# Patient Record
Sex: MALE | Race: WHITE | NOT HISPANIC OR LATINO | Employment: OTHER | URBAN - METROPOLITAN AREA
[De-identification: names, ages, dates, MRNs, and addresses within clinical notes are randomized per-mention and may not be internally consistent; named-entity substitution may affect disease eponyms.]

---

## 2018-08-20 ENCOUNTER — HOSPITAL ENCOUNTER (INPATIENT)
Facility: HOSPITAL | Age: 83
LOS: 3 days | Discharge: NURSING, REHAB OR LONG-TERM CARE | End: 2018-08-25
Attending: EMERGENCY MEDICINE | Admitting: HOSPITALIST

## 2018-08-20 DIAGNOSIS — R53.1 GENERAL WEAKNESS: Primary | ICD-10-CM

## 2018-08-20 DIAGNOSIS — N17.9 AKI (ACUTE KIDNEY INJURY) (HCC): ICD-10-CM

## 2018-08-20 DIAGNOSIS — M25.561 ACUTE PAIN OF RIGHT KNEE: ICD-10-CM

## 2018-08-20 PROCEDURE — 99285 EMERGENCY DEPT VISIT HI MDM: CPT

## 2018-08-21 ENCOUNTER — APPOINTMENT (OUTPATIENT)
Dept: GENERAL RADIOLOGY | Facility: HOSPITAL | Age: 83
End: 2018-08-21

## 2018-08-21 PROBLEM — R53.1 GENERAL WEAKNESS: Status: ACTIVE | Noted: 2018-08-21

## 2018-08-21 PROBLEM — G47.33 OSA (OBSTRUCTIVE SLEEP APNEA): Status: ACTIVE | Noted: 2018-08-21

## 2018-08-21 PROBLEM — F03.90 DEMENTIA (HCC): Status: ACTIVE | Noted: 2018-08-21

## 2018-08-21 PROBLEM — I25.10 CAD (CORONARY ARTERY DISEASE): Status: ACTIVE | Noted: 2018-08-21

## 2018-08-21 PROBLEM — S82.009A PATELLAR FRACTURE: Status: ACTIVE | Noted: 2018-08-21

## 2018-08-21 PROBLEM — R73.02 IGT (IMPAIRED GLUCOSE TOLERANCE): Status: ACTIVE | Noted: 2018-08-21

## 2018-08-21 PROBLEM — R60.9 EDEMA: Status: ACTIVE | Noted: 2018-08-21

## 2018-08-21 PROBLEM — I10 HYPERTENSION: Status: ACTIVE | Noted: 2018-08-21

## 2018-08-21 PROBLEM — Z79.01 ANTICOAGULATED ON COUMADIN: Status: ACTIVE | Noted: 2018-08-21

## 2018-08-21 PROBLEM — E66.01 MORBID OBESITY (HCC): Status: ACTIVE | Noted: 2018-08-21

## 2018-08-21 PROBLEM — I50.9 CHF (CONGESTIVE HEART FAILURE) (HCC): Status: ACTIVE | Noted: 2018-08-21

## 2018-08-21 PROBLEM — N28.9 RENAL INSUFFICIENCY: Status: ACTIVE | Noted: 2018-08-21

## 2018-08-21 PROBLEM — I48.91 A-FIB (HCC): Status: ACTIVE | Noted: 2018-08-21

## 2018-08-21 PROCEDURE — 97162 PT EVAL MOD COMPLEX 30 MIN: CPT

## 2018-08-21 PROCEDURE — G0378 HOSPITAL OBSERVATION PER HR: HCPCS

## 2018-08-21 PROCEDURE — 93010 ELECTROCARDIOGRAM REPORT: CPT | Performed by: INTERNAL MEDICINE

## 2018-08-21 PROCEDURE — 80053 COMPREHEN METABOLIC PANEL: CPT | Performed by: EMERGENCY MEDICINE

## 2018-08-21 PROCEDURE — 85610 PROTHROMBIN TIME: CPT | Performed by: HOSPITALIST

## 2018-08-21 PROCEDURE — 83880 ASSAY OF NATRIURETIC PEPTIDE: CPT | Performed by: HOSPITALIST

## 2018-08-21 PROCEDURE — G8978 MOBILITY CURRENT STATUS: HCPCS

## 2018-08-21 PROCEDURE — 63710000001 DIPHENHYDRAMINE PER 50 MG: Performed by: HOSPITALIST

## 2018-08-21 PROCEDURE — 85610 PROTHROMBIN TIME: CPT | Performed by: EMERGENCY MEDICINE

## 2018-08-21 PROCEDURE — 81001 URINALYSIS AUTO W/SCOPE: CPT | Performed by: EMERGENCY MEDICINE

## 2018-08-21 PROCEDURE — 93005 ELECTROCARDIOGRAM TRACING: CPT | Performed by: HOSPITALIST

## 2018-08-21 PROCEDURE — 85025 COMPLETE CBC W/AUTO DIFF WBC: CPT | Performed by: EMERGENCY MEDICINE

## 2018-08-21 PROCEDURE — 73562 X-RAY EXAM OF KNEE 3: CPT

## 2018-08-21 PROCEDURE — 83036 HEMOGLOBIN GLYCOSYLATED A1C: CPT | Performed by: HOSPITALIST

## 2018-08-21 PROCEDURE — G8979 MOBILITY GOAL STATUS: HCPCS

## 2018-08-21 RX ORDER — SODIUM CHLORIDE 0.9 % (FLUSH) 0.9 %
1-10 SYRINGE (ML) INJECTION AS NEEDED
Status: DISCONTINUED | OUTPATIENT
Start: 2018-08-21 | End: 2018-08-25 | Stop reason: HOSPADM

## 2018-08-21 RX ORDER — DIPHENHYDRAMINE HCL 25 MG
25 CAPSULE ORAL ONCE
Status: COMPLETED | OUTPATIENT
Start: 2018-08-21 | End: 2018-08-21

## 2018-08-21 RX ORDER — ONDANSETRON 4 MG/1
4 TABLET, FILM COATED ORAL EVERY 6 HOURS PRN
Status: DISCONTINUED | OUTPATIENT
Start: 2018-08-21 | End: 2018-08-25 | Stop reason: HOSPADM

## 2018-08-21 RX ORDER — WARFARIN SODIUM 4 MG/1
4 TABLET ORAL DAILY
Status: DISCONTINUED | OUTPATIENT
Start: 2018-08-21 | End: 2018-08-22

## 2018-08-21 RX ORDER — ONDANSETRON 2 MG/ML
4 INJECTION INTRAMUSCULAR; INTRAVENOUS EVERY 6 HOURS PRN
Status: DISCONTINUED | OUTPATIENT
Start: 2018-08-21 | End: 2018-08-25 | Stop reason: HOSPADM

## 2018-08-21 RX ORDER — ALLOPURINOL 100 MG/1
200 TABLET ORAL DAILY
Status: DISCONTINUED | OUTPATIENT
Start: 2018-08-21 | End: 2018-08-25 | Stop reason: HOSPADM

## 2018-08-21 RX ORDER — HYDROCODONE BITARTRATE AND ACETAMINOPHEN 5; 325 MG/1; MG/1
1 TABLET ORAL EVERY 4 HOURS PRN
Status: DISCONTINUED | OUTPATIENT
Start: 2018-08-21 | End: 2018-08-21

## 2018-08-21 RX ORDER — TAMSULOSIN HYDROCHLORIDE 0.4 MG/1
0.4 CAPSULE ORAL DAILY
Status: DISCONTINUED | OUTPATIENT
Start: 2018-08-21 | End: 2018-08-25 | Stop reason: HOSPADM

## 2018-08-21 RX ORDER — MEMANTINE HYDROCHLORIDE 10 MG/1
10 TABLET ORAL EVERY 12 HOURS SCHEDULED
Status: DISCONTINUED | OUTPATIENT
Start: 2018-08-21 | End: 2018-08-25 | Stop reason: HOSPADM

## 2018-08-21 RX ORDER — MEMANTINE HYDROCHLORIDE 10 MG/1
10 TABLET ORAL DAILY
COMMUNITY

## 2018-08-21 RX ORDER — HYDRALAZINE HYDROCHLORIDE 25 MG/1
25 TABLET, FILM COATED ORAL EVERY 12 HOURS SCHEDULED
Status: DISCONTINUED | OUTPATIENT
Start: 2018-08-21 | End: 2018-08-25 | Stop reason: HOSPADM

## 2018-08-21 RX ORDER — DONEPEZIL HYDROCHLORIDE 10 MG/1
10 TABLET, FILM COATED ORAL NIGHTLY
Status: DISCONTINUED | OUTPATIENT
Start: 2018-08-21 | End: 2018-08-25 | Stop reason: HOSPADM

## 2018-08-21 RX ORDER — ONDANSETRON 4 MG/1
4 TABLET, ORALLY DISINTEGRATING ORAL EVERY 6 HOURS PRN
Status: DISCONTINUED | OUTPATIENT
Start: 2018-08-21 | End: 2018-08-25 | Stop reason: HOSPADM

## 2018-08-21 RX ORDER — MULTIPLE VITAMINS W/ MINERALS TAB 9MG-400MCG
1 TAB ORAL DAILY
Status: DISCONTINUED | OUTPATIENT
Start: 2018-08-21 | End: 2018-08-25 | Stop reason: HOSPADM

## 2018-08-21 RX ORDER — ACETAMINOPHEN 325 MG/1
650 TABLET ORAL EVERY 4 HOURS PRN
Status: DISCONTINUED | OUTPATIENT
Start: 2018-08-21 | End: 2018-08-25 | Stop reason: HOSPADM

## 2018-08-21 RX ORDER — ALLOPURINOL 100 MG/1
200 TABLET ORAL DAILY
COMMUNITY

## 2018-08-21 RX ORDER — DONEPEZIL HYDROCHLORIDE 10 MG/1
10 TABLET, FILM COATED ORAL NIGHTLY
COMMUNITY

## 2018-08-21 RX ORDER — LISINOPRIL 40 MG/1
40 TABLET ORAL DAILY
Status: DISCONTINUED | OUTPATIENT
Start: 2018-08-21 | End: 2018-08-25 | Stop reason: HOSPADM

## 2018-08-21 RX ADMIN — HYDRALAZINE HYDROCHLORIDE 25 MG: 25 TABLET, FILM COATED ORAL at 20:11

## 2018-08-21 RX ADMIN — DONEPEZIL HYDROCHLORIDE 10 MG: 10 TABLET, FILM COATED ORAL at 20:11

## 2018-08-21 RX ADMIN — HYDRALAZINE HYDROCHLORIDE 25 MG: 25 TABLET, FILM COATED ORAL at 13:41

## 2018-08-21 RX ADMIN — WARFARIN SODIUM 4 MG: 4 TABLET ORAL at 13:42

## 2018-08-21 RX ADMIN — MULTIPLE VITAMINS W/ MINERALS TAB 1 TABLET: TAB at 13:41

## 2018-08-21 RX ADMIN — LISINOPRIL 40 MG: 40 TABLET ORAL at 13:40

## 2018-08-21 RX ADMIN — MEMANTINE HYDROCHLORIDE 10 MG: 10 TABLET, FILM COATED ORAL at 20:11

## 2018-08-21 RX ADMIN — ALLOPURINOL 200 MG: 100 TABLET ORAL at 13:41

## 2018-08-21 RX ADMIN — MEMANTINE HYDROCHLORIDE 10 MG: 10 TABLET, FILM COATED ORAL at 13:41

## 2018-08-21 RX ADMIN — DIPHENHYDRAMINE HYDROCHLORIDE 25 MG: 25 CAPSULE ORAL at 20:11

## 2018-08-21 RX ADMIN — TAMSULOSIN HYDROCHLORIDE 0.4 MG: 0.4 CAPSULE ORAL at 13:41

## 2018-08-22 ENCOUNTER — APPOINTMENT (OUTPATIENT)
Dept: CARDIOLOGY | Facility: HOSPITAL | Age: 83
End: 2018-08-22
Attending: HOSPITALIST

## 2018-08-22 PROBLEM — E87.70 VOLUME OVERLOAD: Status: ACTIVE | Noted: 2018-08-22

## 2018-08-22 PROBLEM — I50.32 CHRONIC DIASTOLIC CHF (CONGESTIVE HEART FAILURE) (HCC): Chronic | Status: ACTIVE | Noted: 2018-08-22

## 2018-08-22 PROBLEM — R33.9 URINARY RETENTION: Status: ACTIVE | Noted: 2018-08-22

## 2018-08-22 PROBLEM — N17.9 AKI (ACUTE KIDNEY INJURY) (HCC): Status: ACTIVE | Noted: 2018-08-22

## 2018-08-22 PROCEDURE — 93306 TTE W/DOPPLER COMPLETE: CPT | Performed by: INTERNAL MEDICINE

## 2018-08-22 PROCEDURE — G8989 SELF CARE D/C STATUS: HCPCS

## 2018-08-22 PROCEDURE — 85025 COMPLETE CBC W/AUTO DIFF WBC: CPT | Performed by: HOSPITALIST

## 2018-08-22 PROCEDURE — 25010000002 PERFLUTREN (DEFINITY) 8.476 MG IN SODIUM CHLORIDE 0.9 % 10 ML INJECTION: Performed by: HOSPITALIST

## 2018-08-22 PROCEDURE — 97110 THERAPEUTIC EXERCISES: CPT

## 2018-08-22 PROCEDURE — 97166 OT EVAL MOD COMPLEX 45 MIN: CPT

## 2018-08-22 PROCEDURE — 85610 PROTHROMBIN TIME: CPT | Performed by: HOSPITALIST

## 2018-08-22 PROCEDURE — G8988 SELF CARE GOAL STATUS: HCPCS

## 2018-08-22 PROCEDURE — G8987 SELF CARE CURRENT STATUS: HCPCS

## 2018-08-22 PROCEDURE — 80048 BASIC METABOLIC PNL TOTAL CA: CPT | Performed by: HOSPITALIST

## 2018-08-22 PROCEDURE — 97530 THERAPEUTIC ACTIVITIES: CPT

## 2018-08-22 PROCEDURE — 93306 TTE W/DOPPLER COMPLETE: CPT

## 2018-08-22 RX ORDER — BUMETANIDE 0.25 MG/ML
2 INJECTION INTRAMUSCULAR; INTRAVENOUS EVERY 12 HOURS
Status: DISCONTINUED | OUTPATIENT
Start: 2018-08-22 | End: 2018-08-25

## 2018-08-22 RX ADMIN — BUMETANIDE 2 MG: 0.25 INJECTION INTRAMUSCULAR; INTRAVENOUS at 14:50

## 2018-08-22 RX ADMIN — MEMANTINE HYDROCHLORIDE 10 MG: 10 TABLET, FILM COATED ORAL at 22:13

## 2018-08-22 RX ADMIN — DONEPEZIL HYDROCHLORIDE 10 MG: 10 TABLET, FILM COATED ORAL at 22:13

## 2018-08-22 RX ADMIN — MULTIPLE VITAMINS W/ MINERALS TAB 1 TABLET: TAB at 10:09

## 2018-08-22 RX ADMIN — PERFLUTREN 5 ML: 6.52 INJECTION, SUSPENSION INTRAVENOUS at 08:15

## 2018-08-22 RX ADMIN — WARFARIN SODIUM 4 MG: 4 TABLET ORAL at 10:10

## 2018-08-22 RX ADMIN — LISINOPRIL 40 MG: 40 TABLET ORAL at 10:09

## 2018-08-22 RX ADMIN — HYDRALAZINE HYDROCHLORIDE 25 MG: 25 TABLET, FILM COATED ORAL at 22:13

## 2018-08-22 RX ADMIN — TAMSULOSIN HYDROCHLORIDE 0.4 MG: 0.4 CAPSULE ORAL at 10:08

## 2018-08-22 RX ADMIN — ALLOPURINOL 200 MG: 100 TABLET ORAL at 10:09

## 2018-08-22 RX ADMIN — MEMANTINE HYDROCHLORIDE 10 MG: 10 TABLET, FILM COATED ORAL at 10:10

## 2018-08-22 RX ADMIN — HYDRALAZINE HYDROCHLORIDE 25 MG: 25 TABLET, FILM COATED ORAL at 10:08

## 2018-08-23 PROBLEM — I50.33 ACUTE ON CHRONIC DIASTOLIC CHF (CONGESTIVE HEART FAILURE) (HCC): Status: ACTIVE | Noted: 2018-08-23

## 2018-08-23 PROCEDURE — 0TPB80Z REMOVAL OF DRAINAGE DEVICE FROM BLADDER, VIA NATURAL OR ARTIFICIAL OPENING ENDOSCOPIC: ICD-10-PCS | Performed by: UROLOGY

## 2018-08-23 PROCEDURE — 0T9B80Z DRAINAGE OF BLADDER WITH DRAINAGE DEVICE, VIA NATURAL OR ARTIFICIAL OPENING ENDOSCOPIC: ICD-10-PCS | Performed by: UROLOGY

## 2018-08-23 PROCEDURE — 97110 THERAPEUTIC EXERCISES: CPT

## 2018-08-23 PROCEDURE — 80048 BASIC METABOLIC PNL TOTAL CA: CPT | Performed by: HOSPITALIST

## 2018-08-23 PROCEDURE — 85610 PROTHROMBIN TIME: CPT | Performed by: HOSPITALIST

## 2018-08-23 PROCEDURE — 85025 COMPLETE CBC W/AUTO DIFF WBC: CPT | Performed by: HOSPITALIST

## 2018-08-23 RX ORDER — MAGNESIUM HYDROXIDE 1200 MG/15ML
LIQUID ORAL AS NEEDED
Status: DISCONTINUED | OUTPATIENT
Start: 2018-08-23 | End: 2018-08-23 | Stop reason: HOSPADM

## 2018-08-23 RX ADMIN — HYDRALAZINE HYDROCHLORIDE 25 MG: 25 TABLET, FILM COATED ORAL at 21:04

## 2018-08-23 RX ADMIN — DONEPEZIL HYDROCHLORIDE 10 MG: 10 TABLET, FILM COATED ORAL at 21:04

## 2018-08-23 RX ADMIN — MEMANTINE HYDROCHLORIDE 10 MG: 10 TABLET, FILM COATED ORAL at 12:17

## 2018-08-23 RX ADMIN — MULTIPLE VITAMINS W/ MINERALS TAB 1 TABLET: TAB at 12:17

## 2018-08-23 RX ADMIN — LISINOPRIL 40 MG: 40 TABLET ORAL at 12:17

## 2018-08-23 RX ADMIN — TAMSULOSIN HYDROCHLORIDE 0.4 MG: 0.4 CAPSULE ORAL at 12:17

## 2018-08-23 RX ADMIN — HYDRALAZINE HYDROCHLORIDE 25 MG: 25 TABLET, FILM COATED ORAL at 12:17

## 2018-08-23 RX ADMIN — BUMETANIDE 2 MG: 0.25 INJECTION INTRAMUSCULAR; INTRAVENOUS at 00:02

## 2018-08-23 RX ADMIN — BUMETANIDE 2 MG: 0.25 INJECTION INTRAMUSCULAR; INTRAVENOUS at 12:17

## 2018-08-23 RX ADMIN — MEMANTINE HYDROCHLORIDE 10 MG: 10 TABLET, FILM COATED ORAL at 21:04

## 2018-08-24 PROCEDURE — 85610 PROTHROMBIN TIME: CPT | Performed by: HOSPITALIST

## 2018-08-24 PROCEDURE — 85025 COMPLETE CBC W/AUTO DIFF WBC: CPT | Performed by: HOSPITALIST

## 2018-08-24 PROCEDURE — 80048 BASIC METABOLIC PNL TOTAL CA: CPT | Performed by: HOSPITALIST

## 2018-08-24 PROCEDURE — 97110 THERAPEUTIC EXERCISES: CPT

## 2018-08-24 RX ADMIN — ALLOPURINOL 200 MG: 100 TABLET ORAL at 09:02

## 2018-08-24 RX ADMIN — DONEPEZIL HYDROCHLORIDE 10 MG: 10 TABLET, FILM COATED ORAL at 22:02

## 2018-08-24 RX ADMIN — MULTIPLE VITAMINS W/ MINERALS TAB 1 TABLET: TAB at 09:02

## 2018-08-24 RX ADMIN — BUMETANIDE 2 MG: 0.25 INJECTION INTRAMUSCULAR; INTRAVENOUS at 23:52

## 2018-08-24 RX ADMIN — HYDRALAZINE HYDROCHLORIDE 25 MG: 25 TABLET, FILM COATED ORAL at 22:02

## 2018-08-24 RX ADMIN — BUMETANIDE 2 MG: 0.25 INJECTION INTRAMUSCULAR; INTRAVENOUS at 00:09

## 2018-08-24 RX ADMIN — LISINOPRIL 40 MG: 40 TABLET ORAL at 09:02

## 2018-08-24 RX ADMIN — TAMSULOSIN HYDROCHLORIDE 0.4 MG: 0.4 CAPSULE ORAL at 09:02

## 2018-08-24 RX ADMIN — BUMETANIDE 2 MG: 0.25 INJECTION INTRAMUSCULAR; INTRAVENOUS at 12:10

## 2018-08-24 RX ADMIN — MEMANTINE HYDROCHLORIDE 10 MG: 10 TABLET, FILM COATED ORAL at 22:02

## 2018-08-24 RX ADMIN — HYDRALAZINE HYDROCHLORIDE 25 MG: 25 TABLET, FILM COATED ORAL at 09:02

## 2018-08-24 RX ADMIN — MEMANTINE HYDROCHLORIDE 10 MG: 10 TABLET, FILM COATED ORAL at 09:02

## 2018-08-25 VITALS
HEIGHT: 70 IN | SYSTOLIC BLOOD PRESSURE: 107 MMHG | WEIGHT: 279.7 LBS | HEART RATE: 59 BPM | DIASTOLIC BLOOD PRESSURE: 68 MMHG | BODY MASS INDEX: 40.04 KG/M2 | OXYGEN SATURATION: 98 % | TEMPERATURE: 97.5 F | RESPIRATION RATE: 18 BRPM

## 2018-08-25 PROCEDURE — 85025 COMPLETE CBC W/AUTO DIFF WBC: CPT | Performed by: HOSPITALIST

## 2018-08-25 PROCEDURE — 85610 PROTHROMBIN TIME: CPT | Performed by: HOSPITALIST

## 2018-08-25 PROCEDURE — 80048 BASIC METABOLIC PNL TOTAL CA: CPT | Performed by: HOSPITALIST

## 2018-08-25 RX ORDER — WARFARIN SODIUM 3 MG/1
3 TABLET ORAL NIGHTLY
Start: 2018-08-25

## 2018-08-25 RX ORDER — WARFARIN SODIUM 3 MG/1
3 TABLET ORAL
Status: DISCONTINUED | OUTPATIENT
Start: 2018-08-25 | End: 2018-08-25 | Stop reason: HOSPADM

## 2018-08-25 RX ORDER — TAMSULOSIN HYDROCHLORIDE 0.4 MG/1
0.4 CAPSULE ORAL DAILY
Qty: 30 CAPSULE
Start: 2018-08-26

## 2018-08-25 RX ORDER — BUMETANIDE 2 MG/1
2 TABLET ORAL DAILY
Status: DISCONTINUED | OUTPATIENT
Start: 2018-08-25 | End: 2018-08-25 | Stop reason: HOSPADM

## 2018-08-25 RX ADMIN — ALLOPURINOL 200 MG: 100 TABLET ORAL at 10:46

## 2018-08-25 RX ADMIN — WARFARIN SODIUM 3 MG: 3 TABLET ORAL at 18:29

## 2018-08-25 RX ADMIN — BUMETANIDE 2 MG: 2 TABLET ORAL at 12:35

## 2018-08-25 RX ADMIN — MULTIPLE VITAMINS W/ MINERALS TAB 1 TABLET: TAB at 10:46

## 2018-08-25 RX ADMIN — TAMSULOSIN HYDROCHLORIDE 0.4 MG: 0.4 CAPSULE ORAL at 10:46

## 2018-08-25 RX ADMIN — MEMANTINE HYDROCHLORIDE 10 MG: 10 TABLET, FILM COATED ORAL at 10:46

## 2018-09-02 ENCOUNTER — HOSPITAL ENCOUNTER (EMERGENCY)
Facility: HOSPITAL | Age: 83
Discharge: HOME | End: 2018-09-02
Attending: EMERGENCY MEDICINE | Admitting: EMERGENCY MEDICINE

## 2018-09-02 VITALS
DIASTOLIC BLOOD PRESSURE: 96 MMHG | TEMPERATURE: 97.9 F | SYSTOLIC BLOOD PRESSURE: 137 MMHG | HEART RATE: 67 BPM | HEIGHT: 72 IN | OXYGEN SATURATION: 100 % | WEIGHT: 274 LBS | BODY MASS INDEX: 37.11 KG/M2 | RESPIRATION RATE: 16 BRPM

## 2018-09-02 DIAGNOSIS — R31.0 GROSS HEMATURIA: Primary | ICD-10-CM

## 2018-09-02 DIAGNOSIS — Z79.01 ANTICOAGULATED ON COUMADIN: ICD-10-CM

## 2018-09-02 LAB
ANION GAP SERPL CALCULATED.3IONS-SCNC: 12.7 MMOL/L
BACTERIA UR QL AUTO: ABNORMAL /HPF
BASOPHILS # BLD AUTO: 0.02 10*3/MM3 (ref 0–0.2)
BASOPHILS NFR BLD AUTO: 0.3 % (ref 0–1.5)
BILIRUB UR QL STRIP: NEGATIVE
BUN BLD-MCNC: 24 MG/DL (ref 8–23)
BUN/CREAT SERPL: 22.9 (ref 7–25)
CALCIUM SPEC-SCNC: 9.6 MG/DL (ref 8.6–10.5)
CHLORIDE SERPL-SCNC: 101 MMOL/L (ref 98–107)
CLARITY UR: ABNORMAL
CO2 SERPL-SCNC: 25.3 MMOL/L (ref 22–29)
COLOR UR: ABNORMAL
CREAT BLD-MCNC: 1.05 MG/DL (ref 0.76–1.27)
DEPRECATED RDW RBC AUTO: 45 FL (ref 37–54)
EOSINOPHIL # BLD AUTO: 0.18 10*3/MM3 (ref 0–0.7)
EOSINOPHIL NFR BLD AUTO: 2.3 % (ref 0.3–6.2)
ERYTHROCYTE [DISTWIDTH] IN BLOOD BY AUTOMATED COUNT: 13.7 % (ref 11.5–14.5)
GFR SERPL CREATININE-BSD FRML MDRD: 67 ML/MIN/1.73
GLUCOSE BLD-MCNC: 141 MG/DL (ref 65–99)
GLUCOSE UR STRIP-MCNC: NEGATIVE MG/DL
HCT VFR BLD AUTO: 38.6 % (ref 40.4–52.2)
HGB BLD-MCNC: 12 G/DL (ref 13.7–17.6)
HGB UR QL STRIP.AUTO: ABNORMAL
HOLD SPECIMEN: NORMAL
HYALINE CASTS UR QL AUTO: ABNORMAL /LPF
IMM GRANULOCYTES # BLD: 0.05 10*3/MM3 (ref 0–0.03)
IMM GRANULOCYTES NFR BLD: 0.6 % (ref 0–0.5)
INR PPP: 1.96 (ref 0.9–1.1)
KETONES UR QL STRIP: NEGATIVE
LEUKOCYTE ESTERASE UR QL STRIP.AUTO: ABNORMAL
LYMPHOCYTES # BLD AUTO: 1.34 10*3/MM3 (ref 0.9–4.8)
LYMPHOCYTES NFR BLD AUTO: 17 % (ref 19.6–45.3)
MCH RBC QN AUTO: 27.7 PG (ref 27–32.7)
MCHC RBC AUTO-ENTMCNC: 31.1 G/DL (ref 32.6–36.4)
MCV RBC AUTO: 89.1 FL (ref 79.8–96.2)
MONOCYTES # BLD AUTO: 0.41 10*3/MM3 (ref 0.2–1.2)
MONOCYTES NFR BLD AUTO: 5.2 % (ref 5–12)
NEUTROPHILS # BLD AUTO: 5.92 10*3/MM3 (ref 1.9–8.1)
NEUTROPHILS NFR BLD AUTO: 75.2 % (ref 42.7–76)
NITRITE UR QL STRIP: NEGATIVE
PH UR STRIP.AUTO: 5.5 [PH] (ref 5–8)
PLATELET # BLD AUTO: 394 10*3/MM3 (ref 140–500)
PMV BLD AUTO: 9.2 FL (ref 6–12)
POTASSIUM BLD-SCNC: 4 MMOL/L (ref 3.5–5.2)
PROT UR QL STRIP: ABNORMAL
PROTHROMBIN TIME: 22 SECONDS (ref 11.7–14.2)
RBC # BLD AUTO: 4.33 10*6/MM3 (ref 4.6–6)
RBC # UR: ABNORMAL /HPF
REF LAB TEST METHOD: ABNORMAL
SODIUM BLD-SCNC: 139 MMOL/L (ref 136–145)
SP GR UR STRIP: 1.01 (ref 1–1.03)
SQUAMOUS #/AREA URNS HPF: ABNORMAL /HPF
UROBILINOGEN UR QL STRIP: ABNORMAL
WBC NRBC COR # BLD: 7.87 10*3/MM3 (ref 4.5–10.7)
WBC UR QL AUTO: ABNORMAL /HPF

## 2018-09-02 PROCEDURE — 51702 INSERT TEMP BLADDER CATH: CPT

## 2018-09-02 PROCEDURE — 80048 BASIC METABOLIC PNL TOTAL CA: CPT | Performed by: EMERGENCY MEDICINE

## 2018-09-02 PROCEDURE — 36415 COLL VENOUS BLD VENIPUNCTURE: CPT

## 2018-09-02 PROCEDURE — 99283 EMERGENCY DEPT VISIT LOW MDM: CPT

## 2018-09-02 PROCEDURE — 81001 URINALYSIS AUTO W/SCOPE: CPT | Performed by: EMERGENCY MEDICINE

## 2018-09-02 PROCEDURE — 85025 COMPLETE CBC W/AUTO DIFF WBC: CPT | Performed by: EMERGENCY MEDICINE

## 2018-09-02 PROCEDURE — 85610 PROTHROMBIN TIME: CPT | Performed by: EMERGENCY MEDICINE

## 2018-09-02 RX ORDER — UREA 10 %
3 LOTION (ML) TOPICAL NIGHTLY
COMMUNITY

## 2018-09-02 RX ORDER — ACETAMINOPHEN 325 MG/1
650 TABLET ORAL EVERY 6 HOURS PRN
COMMUNITY

## 2018-09-02 NOTE — ED PROVIDER NOTES
CD[facility] EMERGENCY DEPARTMENT ENCOUNTER    CHIEF COMPLAINT  Chief Complaint: penis bleeding  History given by: patient, wife  History limited by: baseline dementia  CDU Room Number: 35/35  PMD: Jessenia Mckeon MD      HPI:  Pt is a 83 y.o. male who presents to the ED via EMS c/o bleeding from penis. Pt accidentally pulled out his Payne catheter yesterday at [facility] where he resides. Pt's wife bedside reports that the pt has been able to urinate, but it has a significant amount of blood clots. Pt is currently taking anticoagulants. Pt is scheduled to see Dr. Marquis this week. Pt is not a smoker and does not use ETOH.    Onset: sudden  Duration: one day  Severity: moderate  Associated symptoms: urinating blood clots  Previous treatment: EMS care and intervention    PAST MEDICAL HISTORY  Active Ambulatory Problems     Diagnosis Date Noted   • General weakness 08/21/2018   • Hypertension 08/21/2018   • A-fib (CMS/[facility]) 08/21/2018   • CHF (congestive heart failure) (CMS/[facility]) 08/21/2018   • Edema 08/21/2018   • KEANU (obstructive sleep apnea) 08/21/2018   • Morbid obesity (CMS/[facility]) 08/21/2018   • Dementia 08/21/2018   • Renal insufficiency 08/21/2018   • IGT (impaired glucose tolerance) 08/21/2018   • CAD (coronary artery disease) 08/21/2018   • Patellar fracture 08/21/2018   • Anticoagulated on Coumadin for h/o DVT 08/21/2018   • Chronic diastolic CHF (congestive heart failure) (CMS/[facility]) 08/22/2018   • Urinary retention 08/22/2018   • Volume overload 08/22/2018   • EVARISTO (acute kidney injury) (CMS/[facility]) 08/22/2018   • Acute on chronic diastolic CHF (congestive heart failure) (CMS/[facility]) 08/23/2018     Resolved Ambulatory Problems     Diagnosis Date Noted   • No Resolved Ambulatory Problems     Past Medical History:   Diagnosis Date   • A-fib (CMS/[facility])    • CHF (congestive heart failure) (CMS/[facility])    • Dementia    • DVT (deep venous thrombosis) (CMS/[facility])    • Hypertension        PAST SURGICAL HISTORY  Past Surgical  History:   Procedure Laterality Date   • BACK SURGERY     • CYSTOSCOPY N/A 8/23/2018    Procedure: CYSTOSCOPY WITH HERNANDEZ CATHETER EXCHANGE;  Surgeon: Eitan Marquis MD;  Location: [facility] OR;  Service: Urology   • EYE SURGERY     • JOINT REPLACEMENT     • REPLACEMENT TOTAL KNEE BILATERAL         FAMILY HISTORY  History reviewed. No pertinent family history.    SOCIAL HISTORY  Social History     Social History   • Marital status:      Spouse name: N/A   • Number of children: N/A   • Years of education: N/A     Occupational History   • Not on file.     Social History Main Topics   • Smoking status: Former Smoker   • Smokeless tobacco: Never Used   • Alcohol use No   • Drug use: No   • Sexual activity: Defer     Other Topics Concern   • Not on file     Social History Narrative   • No narrative on file       ALLERGIES  Patient has no known allergies.    REVIEW OF SYSTEMS  Review of Systems   Constitutional: Negative.  Negative for chills and fever.   HENT: Negative.  Negative for sore throat.    Eyes: Negative.    Respiratory: Negative.  Negative for cough.    Cardiovascular: Negative.  Negative for chest pain.   Gastrointestinal: Negative.    Genitourinary: Positive for penile pain. Negative for dysuria.        Bleeding from penis   Musculoskeletal: Negative.  Negative for back pain.   Skin: Negative.  Negative for rash.   Neurological: Negative.  Negative for headaches.   All other systems reviewed and are negative.      PHYSICAL EXAM  ED Triage Vitals   Temp Heart Rate Resp BP SpO2   09/02/18 1231 09/02/18 1230 09/02/18 1230 09/02/18 1230 09/02/18 1230   97.9 °F (36.6 °C) 77 18 113/89 95 %      Temp src Heart Rate Source Patient Position BP Location FiO2 (%)   09/02/18 1231 09/02/18 1230 -- -- --   Oral Monitor          Physical Exam   Constitutional: No distress.   HENT:   Head: Normocephalic and atraumatic.   Mouth/Throat: Oropharynx is clear and moist.   Eyes:   Unremarkable   Cardiovascular:  Normal rate and regular rhythm.    Pulmonary/Chest: Breath sounds normal. No respiratory distress.   Abdominal: There is no tenderness.   Musculoskeletal: He exhibits no tenderness.   Chronic hyperpigmentation BLE, 2+ pedal edema   Neurological: He is alert. He has normal strength.   Skin: No rash noted.   Nursing note and vitals reviewed.      LAB RESULTS  Lab Results (last 24 hours)     Procedure Component Value Units Date/Time    CBC & Differential [[id number]] Collected:  09/02/18 1253    Specimen:  Blood Updated:  09/02/18 1330    Narrative:       The following orders were created for panel order CBC & Differential.  Procedure                               Abnormality         Status                     ---------                               -----------         ------                     CBC Auto Differential[[id number]]        Abnormal            Final result                 Please view results for these tests on the individual orders.    Protime-INR [[id number]]  (Abnormal) Collected:  09/02/18 1253    Specimen:  Blood Updated:  09/02/18 1347     Protime 22.0 (H) Seconds      INR 1.96 (H)    CBC Auto Differential [[id number]]  (Abnormal) Collected:  09/02/18 1253    Specimen:  Blood Updated:  09/02/18 1330     WBC 7.87 10*3/mm3      RBC 4.33 (L) 10*6/mm3      Hemoglobin 12.0 (L) g/dL      Hematocrit 38.6 (L) %      MCV 89.1 fL      MCH 27.7 pg      MCHC 31.1 (L) g/dL      RDW 13.7 %      RDW-SD 45.0 fl      MPV 9.2 fL      Platelets 394 10*3/mm3      Neutrophil % 75.2 %      Lymphocyte % 17.0 (L) %      Monocyte % 5.2 %      Eosinophil % 2.3 %      Basophil % 0.3 %      Immature Grans % 0.6 (H) %      Neutrophils, Absolute 5.92 10*3/mm3      Lymphocytes, Absolute 1.34 10*3/mm3      Monocytes, Absolute 0.41 10*3/mm3      Eosinophils, Absolute 0.18 10*3/mm3      Basophils, Absolute 0.02 10*3/mm3      Immature Grans, Absolute 0.05 (H) 10*3/mm3     Basic Metabolic Panel [[id number]]  (Abnormal) Collected:  09/02/18  1253    Specimen:  Blood Updated:  09/02/18 1351     Glucose 141 (H) mg/dL      BUN 24 (H) mg/dL      Creatinine 1.05 mg/dL      Sodium 139 mmol/L      Potassium 4.0 mmol/L      Chloride 101 mmol/L      CO2 25.3 mmol/L      Calcium 9.6 mg/dL      eGFR Non African Amer 67 mL/min/1.73      BUN/Creatinine Ratio 22.9     Anion Gap 12.7 mmol/L     Narrative:       The MDRD GFR formula is only valid for adults with stable renal function between ages 18 and 70.    Urinalysis With Microscopic If Indicated (No Culture) - Urine, Catheter [[id number]]  (Abnormal) Collected:  09/02/18 1318    Specimen:  Urine from Urine, Catheter Updated:  09/02/18 1412     Color, UA Dark Yellow (A)     Appearance, UA Cloudy (A)     pH, UA 5.5     Specific Gravity, UA 1.015     Glucose, UA Negative     Ketones, UA Negative     Bilirubin, UA Negative     Blood, UA Large (3+) (A)     Protein, UA 30 mg/dL (1+) (A)     Leuk Esterase, UA Moderate (2+) (A)     Nitrite, UA Negative     Urobilinogen, UA 1.0 E.U./dL    Urinalysis, Microscopic Only - Urine, Clean Catch [[id number]]  (Abnormal) Collected:  09/02/18 1318    Specimen:  Urine from Urine, Catheter Updated:  09/02/18 1412     RBC, UA Too Numerous to Count (A) /HPF      WBC, UA 13-20 (A) /HPF      Bacteria, UA 1+ (A) /HPF      Squamous Epithelial Cells, UA 0-2 /HPF      Hyaline Casts, UA None Seen /LPF      Methodology Manual Light Microscopy          I ordered the above labs and reviewed the results    PROCEDURES  Procedures      PROGRESS AND CONSULTS     1235  Discussed the plan to check INR and place a 3 way urinary catheter and check his response    1328  BP- 113/89 HR- 77 Temp- 97.9 °F (36.6 °C) (Oral) O2 sat- 95%  Rechecked the patient who is in NAD and is resting comfortably. Pt tolerating and irrigation is progressively becoming more clear. Discussed the plan to d/c providing labs are unremarkable.     1430  BP- 137/96 HR- 67 Temp- 97.9 °F (36.6 °C) (Oral) O2 sat- 100%  Rechecked the  patient who is in NAD and is resting comfortably. Irrigation is now clear. DIscussed w/ the wife the plan to d/c back to the facility. Pt's wife understands and agrees with the plan, all questions answered. Pt's wife advised she would transport the pt back to the facility.    MEDICAL DECISION MAKING  Results were reviewed/discussed with the patient and they were also made aware of online access. Pt also made aware that some labs, such as cultures, will not be resulted during ER visit and follow up with PMD is necessary.     MDM  Number of Diagnoses or Management Options  Anticoagulated on Coumadin:   Gross hematuria:      Amount and/or Complexity of Data Reviewed  Clinical lab tests: reviewed (INR 1.96, UA has too numerous to count RBC)  Decide to obtain previous medical records or to obtain history from someone other than the patient: yes    Patient Progress  Patient progress: stable         DIAGNOSIS  Final diagnoses:   Gross hematuria   Anticoagulated on Coumadin       DISPOSITION  DISCHARGE    Patient discharged in stable condition.    Reviewed implications of results, diagnosis, meds, responsibility to follow up, warning signs and symptoms of possible worsening, potential complications and reasons to return to ER.    Patient/Family voiced understanding of above instructions.    Discussed plan for discharge, as there is no emergent indication for admission. Patient referred to primary care provider for BP management due to today's BP. Pt/family is agreeable and understands need for follow up and repeat testing.  Pt is aware that discharge does not mean that nothing is wrong but it indicates no emergency is present that requires admission and they must continue care with follow-up as given below or physician of their choice.     FOLLOW-UP  Eitan Marquis MD  [address] [facility] [address] [phone]    In 3 days      Jessenia Mckeon MD  [address]  [address]  [facility]  [address]  [phone]      As needed         Medication List      No changes were made to your prescriptions during this visit.       Latest Documented Vital Signs:  As of 2:32 PM  BP- 137/96 HR- 67 Temp- 97.9 °F (36.6 °C) (Oral) O2 sat- 100%    --  Documentation assistance provided by petra Martin for Dr. Irizarry.  Information recorded by the scribe was done at my direction and has been verified and validated by me.       Paris Martin  09/02/18 1432       Johnny Irizarry MD  09/02/18 1439

## 2018-09-02 NOTE — ED NOTES
Pt had dark yellow cloudy urine with sediment output after insertion of 3 way cath. Pt tolerated well      Henrietta Pinto RN  09/02/18 0888

## 2018-09-02 NOTE — ED TRIAGE NOTES
Pt sent from [facility] home pt pulled out his disla pt is on coumadin reports bleeding from penis since yesterday.

## 2021-03-15 ENCOUNTER — BULK ORDERING (OUTPATIENT)
Dept: CASE MANAGEMENT | Facility: OTHER | Age: 86
End: 2021-03-15

## 2021-03-15 DIAGNOSIS — Z23 IMMUNIZATION DUE: ICD-10-CM

## 2021-09-20 ENCOUNTER — APPOINTMENT (OUTPATIENT)
Dept: WOUND CARE | Facility: HOSPITAL | Age: 86
End: 2021-09-20

## (undated) DEVICE — LOU CYSTO: Brand: MEDLINE INDUSTRIES, INC.

## (undated) DEVICE — GLV SURG SENSICARE MICRO PF LF 7.5 STRL

## (undated) DEVICE — TOTAL TRAY, 16FR 10ML SIL FOLEY, URN: Brand: MEDLINE

## (undated) DEVICE — TIDISHIELD UROLOGY DRAIN BAGS FROSTY VINYL STERILE FITS SIEMENS UROSKOP ACCESS 20 PER CASE: Brand: TIDISHIELD